# Patient Record
Sex: FEMALE | Race: WHITE | NOT HISPANIC OR LATINO | Employment: FULL TIME | URBAN - METROPOLITAN AREA
[De-identification: names, ages, dates, MRNs, and addresses within clinical notes are randomized per-mention and may not be internally consistent; named-entity substitution may affect disease eponyms.]

---

## 2024-04-29 ENCOUNTER — HOSPITAL ENCOUNTER (EMERGENCY)
Facility: CLINIC | Age: 29
Discharge: HOME | End: 2024-04-29
Attending: EMERGENCY MEDICINE | Admitting: EMERGENCY MEDICINE
Payer: COMMERCIAL

## 2024-04-29 VITALS
OXYGEN SATURATION: 100 % | TEMPERATURE: 98.2 F | HEIGHT: 68 IN | WEIGHT: 255 LBS | BODY MASS INDEX: 38.65 KG/M2 | SYSTOLIC BLOOD PRESSURE: 181 MMHG | RESPIRATION RATE: 18 BRPM | DIASTOLIC BLOOD PRESSURE: 103 MMHG | HEART RATE: 119 BPM

## 2024-04-29 DIAGNOSIS — L50.9 URTICARIA: ICD-10-CM

## 2024-04-29 PROCEDURE — 99284 EMERGENCY DEPT VISIT MOD MDM: CPT

## 2024-04-29 RX ORDER — CETIRIZINE HYDROCHLORIDE 10 MG/1
10 TABLET ORAL 2 TIMES DAILY PRN
Qty: 20 TABLET | Refills: 0 | Status: SHIPPED | OUTPATIENT
Start: 2024-04-29 | End: 2024-05-09

## 2024-04-29 RX ORDER — EPINEPHRINE 0.3 MG/.3ML
0.3 INJECTION SUBCUTANEOUS
Qty: 2 EACH | Refills: 0 | Status: SHIPPED | OUTPATIENT
Start: 2024-04-29

## 2024-04-29 RX ORDER — FAMOTIDINE 20 MG/1
20 TABLET, FILM COATED ORAL 2 TIMES DAILY
Qty: 20 TABLET | Refills: 0 | Status: SHIPPED | OUTPATIENT
Start: 2024-04-29

## 2024-04-29 ASSESSMENT — COLUMBIA-SUICIDE SEVERITY RATING SCALE - C-SSRS
1. IN THE PAST MONTH, HAVE YOU WISHED YOU WERE DEAD OR WISHED YOU COULD GO TO SLEEP AND NOT WAKE UP?: NO
6. HAVE YOU EVER DONE ANYTHING, STARTED TO DO ANYTHING, OR PREPARED TO DO ANYTHING TO END YOUR LIFE?: NO
2. HAVE YOU ACTUALLY HAD ANY THOUGHTS OF KILLING YOURSELF IN THE PAST MONTH?: NO

## 2024-04-29 NOTE — ED NOTES
Pt discharged by MD from [facility] so no primary RN assigned.  No discharge RN.  Writer is charge and taking pt off the board per MD request

## 2024-04-29 NOTE — DISCHARGE INSTRUCTIONS
Recommend Pepcid twice a day.  Recommend Zyrtec twice a day.  Recommend cool compresses.  If symptoms worsen without wheezing, throat swelling or tongue swelling use epinephrine and call 911

## 2024-04-29 NOTE — ED PROVIDER NOTES
EMERGENCY DEPARTMENT ENCOUNTER      NAME: Rea Jung  AGE: 29 year old female  YOB: 1995  MRN: [id number]  EVALUATION DATE & TIME: No admission date for patient encounter.    PCP: Maxim Mcfarlane    ED PROVIDER: Kiel Hobbs MD      Chief Complaint   Patient presents with    Allergic Reaction         FINAL IMPRESSION:  1. Urticaria          ED COURSE & MEDICAL DECISION MAKING:    Pertinent Labs & Imaging studies reviewed. (See chart for details)  29 year old female presents to the Emergency Department for evaluation of itchy urticarial rash    History and examination not suggestive of angioedema or anaphylaxis.  Periorbital cellulitis unlikely.    Has had a rash for few days and seem to get worse after taking a dose of prednisone today.    Highly doubt Mora-Ceasar syndrome, lupus, cellulitis    Given prescription for Zyrtec, Pepcid, epinephrine    Plan for Zyrtec twice daily, Pepcid twice daily, cool compresses, no make-up, epinephrine if symptoms worsen and call 9 1 otherwise follow-up primary care doctor or dermatologist or allergy         Medical Decision Making  Obtained supplemental history:Supplemental history obtained?: Documented in chart  Reviewed external records: External records reviewed?: No  Care impacted by chronic illness:Mental Health  Care significantly affected by social determinants of health:Access to Medical Care  Did you consider but not order tests?: Work up considered but not performed and documented in chart, if applicable  Did you interpret images independently?: Independent interpretation of ECG and images noted in documentation, when applicable.  Consultation discussion with other provider:Did you involve another provider (consultant, , pharmacy, etc.)?: No  Discharge. I prescribed additional prescription strength medication(s) as charted. N/A.    At the conclusion of the encounter I discussed the results of all of the tests and the disposition. The questions were  answered. The patient or family acknowledged understanding and was agreeable with the care plan.       MEDICATIONS GIVEN IN THE EMERGENCY:  Medications - No data to display    NEW PRESCRIPTIONS STARTED AT TODAY'S ER VISIT  New Prescriptions    CETIRIZINE (ZYRTEC) 10 MG TABLET    Take 1 tablet (10 mg) by mouth 2 times daily as needed for allergies (1 tab up to twice a day as needed for itch, rash, hives, allergy)    EPINEPHRINE (ANY BX GENERIC EQUIV) 0.3 MG/0.3ML INJECTION 2-PACK    Inject 0.3 mLs (0.3 mg) into the muscle once as needed for anaphylaxis May repeat one time in 5-15 minutes if response to initial dose is inadequate.    FAMOTIDINE (PEPCID) 20 MG TABLET    Take 1 tablet (20 mg) by mouth 2 times daily          =================================================================    HPI    Patient information was obtained from: Patient        Rea Jung is a 29 year old female with a pertinent history of seasonal allergy who presents to this ED for evaluation of itchy rash to periorbital region and neck and chest and arm.  Denies any lip swelling or tongue swelling or neck tightness or wheezing or vomit    Was seen in urgent care and given prednisone and shortly after prednisone her symptoms seemed of gotten worse.  Denies any new make-ups or foods or vaccines or illness    Is trying to schedule a appointment to see an allergy    Recently reacted to some adhesive from EKG monitor that was done for palpitation          REVIEW OF SYSTEMS   Review of Systems     PAST MEDICAL HISTORY:  History reviewed. No pertinent past medical history.    PAST SURGICAL HISTORY:  No past surgical history on file.        CURRENT MEDICATIONS:    cetirizine (ZYRTEC) 10 MG tablet  EPINEPHrine (ANY BX GENERIC EQUIV) 0.3 MG/0.3ML injection 2-pack  famotidine (PEPCID) 20 MG tablet        ALLERGIES:  Allergies   Allergen Reactions    Pollen Extract        FAMILY HISTORY:  No family history on file.    SOCIAL HISTORY:   Social History  "    Socioeconomic History    Marital status:      Spouse name: None    Number of children: None    Years of education: None    Highest education level: None     Social Determinants of Health     Financial Resource Strain: Low Risk  (12/29/2023)    Received from [facility]    Financial Resource Strain     Difficulty of Paying Living Expenses: 3   Food Insecurity: No Food Insecurity (12/29/2023)    Received from [facility]    Food Insecurity     Worried About Running Out of Food in the Last Year: 1   Transportation Needs: No Transportation Needs (12/29/2023)    Received from [facility]    Transportation Needs     Lack of Transportation (Medical): 1   Social Connections: Socially Integrated (12/29/2023)    Received from [facility]    Social Connections     Frequency of Communication with Friends and Family: 0    Received from [facility]   Housing Stability: Low Risk  (12/29/2023)    Received from [facility]    Housing Stability     Unable to Pay for Housing in the Last Year: 1       VITALS:  BP (!) 181/103   Pulse 119   Temp 98.2  F (36.8  C) (Temporal)   Resp 18   Ht 1.727 m (5' 8\")   Wt 115.7 kg (255 lb)   SpO2 100%   BMI 38.77 kg/m      PHYSICAL EXAM      Vitals: BP (!) 181/103   Pulse 119   Temp 98.2  F (36.8  C) (Temporal)   Resp 18   Ht 1.727 m (5' 8\")   Wt 115.7 kg (255 lb)   SpO2 100%   BMI 38.77 kg/m    General: Appears in no acute distress, awake, alert, interactive.  Eyes: Conjunctivae non-injected. Sclera anicteric.  HENT: Atraumatic.  No angioedema.  No stridor no wheezes  Neck: Supple.  Respiratory/Chest: Respiration unlabored.  Abdomen: non distended  Musculoskeletal: Normal extremities. No edema or erythema.  Skin: Erythematous plaques to symmetric periorbital region and neck as well as " limbs  Neurologic: Face symmetric, moves all extremities spontaneously. Speech clear.  Psychiatric: Oriented to person, place, and time. Affect appropriate.    LAB:  All pertinent labs reviewed and interpreted.       RADIOLOGY:  Reviewed all pertinent imaging. Please see official radiology report.  No orders to display             Kiel Hobbs MD  [facility] EMERGENCY ROOM  [address] [id number]  [phone]        Kiel Hobbs MD  04/29/24 [id number]

## 2024-04-29 NOTE — ED TRIAGE NOTES
Pt reports a possible allergic reaction. She woke up with swelling around her eyes. She went to  earlier today and they gave her prednisone. She has been taking zyrtec and benadryl today as well. She then noticed that redness around the eyes and some blotchy redness rash around her chest and right arm.

## 2025-02-16 ENCOUNTER — HEALTH MAINTENANCE LETTER (OUTPATIENT)
Age: 30
End: 2025-02-16